# Patient Record
Sex: MALE | Race: WHITE | Employment: OTHER | URBAN - METROPOLITAN AREA
[De-identification: names, ages, dates, MRNs, and addresses within clinical notes are randomized per-mention and may not be internally consistent; named-entity substitution may affect disease eponyms.]

---

## 2019-03-22 ENCOUNTER — OFFICE VISIT (OUTPATIENT)
Dept: DERMATOLOGY | Facility: CLINIC | Age: 79
End: 2019-03-22
Payer: MEDICARE

## 2019-03-22 VITALS — HEART RATE: 70 BPM | DIASTOLIC BLOOD PRESSURE: 60 MMHG | OXYGEN SATURATION: 95 % | SYSTOLIC BLOOD PRESSURE: 98 MMHG

## 2019-03-22 DIAGNOSIS — D48.5 NEOPLASM OF UNCERTAIN BEHAVIOR OF SKIN: Primary | ICD-10-CM

## 2019-03-22 DIAGNOSIS — L82.0 INFLAMED SEBORRHEIC KERATOSIS: ICD-10-CM

## 2019-03-22 DIAGNOSIS — L82.1 SEBORRHEIC KERATOSIS: ICD-10-CM

## 2019-03-22 DIAGNOSIS — L57.0 ACTINIC KERATOSIS: ICD-10-CM

## 2019-03-22 DIAGNOSIS — L81.4 LENTIGO: ICD-10-CM

## 2019-03-22 PROCEDURE — 99203 OFFICE O/P NEW LOW 30 MIN: CPT | Mod: 25 | Performed by: PHYSICIAN ASSISTANT

## 2019-03-22 PROCEDURE — 88305 TISSUE EXAM BY PATHOLOGIST: CPT | Mod: TC | Performed by: PHYSICIAN ASSISTANT

## 2019-03-22 PROCEDURE — 17000 DESTRUCT PREMALG LESION: CPT | Mod: 59 | Performed by: PHYSICIAN ASSISTANT

## 2019-03-22 PROCEDURE — 17003 DESTRUCT PREMALG LES 2-14: CPT | Performed by: PHYSICIAN ASSISTANT

## 2019-03-22 PROCEDURE — 17110 DESTRUCTION B9 LES UP TO 14: CPT | Performed by: PHYSICIAN ASSISTANT

## 2019-03-22 PROCEDURE — 11102 TANGNTL BX SKIN SINGLE LES: CPT | Mod: 59 | Performed by: PHYSICIAN ASSISTANT

## 2019-03-22 RX ORDER — INSULIN DETEMIR 100 [IU]/ML
INJECTION, SOLUTION SUBCUTANEOUS
COMMUNITY
Start: 2019-03-06

## 2019-03-22 RX ORDER — WARFARIN SODIUM 7.5 MG/1
TABLET ORAL
COMMUNITY
Start: 2018-12-05

## 2019-03-22 RX ORDER — SACUBITRIL AND VALSARTAN 97; 103 MG/1; MG/1
TABLET, FILM COATED ORAL
COMMUNITY
Start: 2019-02-06

## 2019-03-22 RX ORDER — CARVEDILOL 12.5 MG/1
12.5 TABLET ORAL
COMMUNITY
Start: 2019-03-06

## 2019-03-22 RX ORDER — ASPIRIN 81 MG/1
81 TABLET, CHEWABLE ORAL
COMMUNITY

## 2019-03-22 RX ORDER — WARFARIN SODIUM 7.5 MG/1
TABLET ORAL
COMMUNITY
Start: 2019-02-19

## 2019-03-22 RX ORDER — NITROGLYCERIN 0.4 MG/1
0.4 TABLET SUBLINGUAL
COMMUNITY
Start: 2015-07-31

## 2019-03-22 RX ORDER — ATORVASTATIN CALCIUM 80 MG/1
TABLET, FILM COATED ORAL
COMMUNITY
Start: 2019-03-04

## 2019-03-22 RX ORDER — DIPHENOXYLATE HYDROCHLORIDE AND ATROPINE SULFATE 2.5; .025 MG/1; MG/1
1 TABLET ORAL
COMMUNITY
Start: 2018-03-07

## 2019-03-22 RX ORDER — AMOXICILLIN 500 MG/1
CAPSULE ORAL
COMMUNITY
Start: 2013-12-04

## 2019-03-22 RX ORDER — FUROSEMIDE 40 MG
40 TABLET ORAL
COMMUNITY
Start: 2019-03-06

## 2019-03-22 RX ORDER — INSULIN PUMP SYRINGE, 3 ML
EACH MISCELLANEOUS
COMMUNITY
Start: 2018-11-29

## 2019-03-22 RX ORDER — FUROSEMIDE 40 MG
TABLET ORAL
COMMUNITY
Start: 2019-01-16

## 2019-03-22 RX ORDER — INSULIN ASPART 100 [IU]/ML
INJECTION, SOLUTION INTRAVENOUS; SUBCUTANEOUS
COMMUNITY
Start: 2019-03-06

## 2019-03-22 RX ORDER — FERROUS GLUCONATE 324(38)MG
TABLET ORAL
COMMUNITY
Start: 2018-03-31

## 2019-03-22 RX ORDER — SPIRONOLACTONE 25 MG/1
25 TABLET ORAL
COMMUNITY
Start: 2018-12-11

## 2019-03-22 RX ORDER — ATORVASTATIN CALCIUM 80 MG/1
80 TABLET, FILM COATED ORAL
COMMUNITY
Start: 2018-11-29

## 2019-03-22 RX ORDER — INSULIN PUMP SYRINGE, 3 ML
EACH MISCELLANEOUS
COMMUNITY
Start: 2018-11-17

## 2019-03-22 RX ORDER — CARVEDILOL 12.5 MG/1
TABLET ORAL
COMMUNITY
Start: 2019-03-14

## 2019-03-22 RX ORDER — SACUBITRIL AND VALSARTAN 49; 51 MG/1; MG/1
TABLET, FILM COATED ORAL
COMMUNITY
Start: 2019-01-16

## 2019-03-22 NOTE — LETTER
3/22/2019         RE: Bertha High  [address] [facility] [address]        Dear Colleague,    Thank you for referring your patient, Bertha High, to the [facility]. Please see a copy of my visit note below.    Bertha High is a 78 year old year old male patient here today for spot on scalp.  Patient states this has been present for few months.  Patient reports the following symptoms:  Not healing.  Patient reports the following previous treatments none.  Patient has no other skin complaints today.  Remainder of the HPI, Meds, PMH, Allergies, FH, and SH was reviewed in chart.    Pertinent Hx:   No personal history of skin cancer.   History reviewed. No pertinent past medical history.    History reviewed. No pertinent surgical history.     Family History   Problem Relation Age of Onset     Melanoma No family hx of        Social History     Socioeconomic History     Marital status:      Spouse name: Not on file     Number of children: Not on file     Years of education: Not on file     Highest education level: Not on file   Occupational History     Not on file   Social Needs     Financial resource strain: Not on file     Food insecurity:     Worry: Not on file     Inability: Not on file     Transportation needs:     Medical: Not on file     Non-medical: Not on file   Tobacco Use     Smoking status: Former Smoker     Packs/day: 2.00     Years: 22.00     Pack years: 44.00     Types: Cigarettes     Last attempt to quit: 3/22/1968     Years since quittin.0     Smokeless tobacco: Never Used   Substance and Sexual Activity     Alcohol use: Not on file     Drug use: Not on file     Sexual activity: Not on file   Lifestyle     Physical activity:     Days per week: Not on file     Minutes per session: Not on file     Stress: Not on file   Relationships     Social connections:     Talks on phone: Not on file     Gets together: Not on file     Attends Shinto service: Not on file      Active member of club or organization: Not on file     Attends meetings of clubs or organizations: Not on file     Relationship status: Not on file     Intimate partner violence:     Fear of current or ex partner: Not on file     Emotionally abused: Not on file     Physically abused: Not on file     Forced sexual activity: Not on file   Other Topics Concern     Not on file   Social History Narrative     Not on file       Outpatient Encounter Medications as of 3/22/2019   Medication Sig Dispense Refill     amoxicillin (AMOXIL) 500 MG capsule Take FOUR tablets one hour before dental procedures.       aspirin (ASA) 81 MG chewable tablet Take 81 mg by mouth       atorvastatin (LIPITOR) 80 MG tablet Take 80 mg by mouth       atorvastatin (LIPITOR) 80 MG tablet        Blood Glucose Monitoring Suppl (FIFTY50 GLUCOSE METER 2.0) w/Device KIT Dispense meter, test strips, lancets covered by pt ins. E11.9 NIDDM type II - Test 4 times/day. Reason: New  meds       Blood Glucose Monitoring Suppl (FIFTY50 GLUCOSE METER 2.0) w/Device KIT Dispense meter, test strips, lancets covered by pt ins. E11.65 NIDDM type II, uncontrolled - Test 4 times/day. Reason: New medication       carvedilol (COREG) 12.5 MG tablet Take 12.5 mg by mouth       carvedilol (COREG) 12.5 MG tablet        cholecalciferol (D 5000) 5000 units CAPS Take one tablet every other day with iron.       DOCOSAHEXAENOIC ACID PO Take 1 tablet by mouth       ENTRESTO 49-51 MG per tablet        ENTRESTO  MG per tablet        ferrous gluconate (FERGON) 324 (38 Fe) MG tablet Take one tablet with food every other day, not daily. Take with orange juice or vitamin C to help iron absorption.       furosemide (LASIX) 40 MG tablet Take 40 mg by mouth       furosemide (LASIX) 40 MG tablet        insulin aspart (NOVOLOG FLEXPEN) 100 UNIT/ML pen Inject 8 units for breakfast and 15 units for lunch and dinner  - do not take if not eating.       insulin detemir (LEVEMIR FLEXTOUCH)  100 UNIT/ML pen Inject 15 Units Subcutaneous       insulin pen needle (BD SILKE U/F) 32G X 4 MM miscellaneous For administering insulin at home. 5 shots a day       insulin pen needle (BD SILKE U/F) 32G X 4 MM miscellaneous Use with Insulin       LEVEMIR FLEXTOUCH 100 UNIT/ML pen        metFORMIN (GLUCOPHAGE) 1000 MG tablet Take 1,000 mg by mouth       metFORMIN (GLUCOPHAGE) 1000 MG tablet        Misc Natural Products (GLUCOSAMINE CHONDROITIN ADV PO)        Multiple Vitamin (MULTI-VITAMINS) TABS Take 1 tablet by mouth       nitroGLYcerin (NITROSTAT) 0.4 MG sublingual tablet Place 0.4 mg under the tongue       NOVOLOG FLEXPEN 100 UNIT/ML soln        omeprazole (PRILOSEC) 20 MG DR capsule Take 20 mg by mouth       sacubitril-valsartan (ENTRESTO)  MG per tablet Take 1 tablet by mouth       spironolactone (ALDACTONE) 25 MG tablet Take 25 mg by mouth       warfarin (COUMADIN) 7.5 MG tablet Take 11.25 mg (7.5 mg x 1.5) on Thu; 7.5 mg (7.5 mg x 1) all other days or as directed.       warfarin (COUMADIN) 7.5 MG tablet        No facility-administered encounter medications on file as of 3/22/2019.              Review Of Systems  Skin: As above  Eyes: negative  Ears/Nose/Throat: negative  Respiratory: No shortness of breath, dyspnea on exertion, cough, or hemoptysis  Cardiovascular: negative  Gastrointestinal: negative  Genitourinary: negative  Musculoskeletal: negative  Neurologic: negative  Psychiatric: negative  Hematologic/Lymphatic/Immunologic: negative  Endocrine: negative      O:   NAD, WDWN, Alert & Oriented, Mood & Affect wnl, Vitals stable   Here today alone   BP 98/60   Pulse 70   SpO2 95%    General appearance normal   Vitals stable   Alert, oriented and in no acute distress     1.2 cm pink scaly plaque on frontal scalp  Pink gritty papules on scalp x 4  Brown stuck on papule on left side of nose     Eyes: Conjunctivae/lids:Normal     ENT: Lips: normal    MSK:Normal    Pulm: Breathing Normal    Neuro/Psych:  Orientation:Normal; Mood/Affect:Normal  A/P:  1. R/O SCC on frontal scalp  TANGENTIAL BIOPSY SENT OUT:  After consent, anesthesia with LEC and prep, tangential excision performed and specimen sent out for permanent section histology.  No complications and routine wound care. Patient told to call our office in 1-2 weeks for result.      2. Actinic keratosis x 4 on scalp   LN2:  Treated with LN2 for 5s for 1-2 cycles. Warned risks of blistering, pain, pigment change, scarring, and incomplete resolution.  Advised patient to return if lesions do not completely resolve.  Wound care sheet given.  3. Inflamed seborrheic keratosis on nose x 1  LN2:  Treated with LN2 for 5s for 1-2 cycles. Warned risks of blistering, pain, pigment change, scarring, and incomplete resolution.  Advised patient to return if lesions do not completely resolve.  Wound care sheet given.  3. Seborrheic keratosis, lentigo  BENIGN LESIONS DISCUSSED WITH PATIENT:  I discussed the specifics of tumor, prognosis, and genetics of benign lesions.  I explained that treatment of these lesions would be purely cosmetic and not medically neccessary.  I discussed with patient different removal options including excision, cautery and /or laser.      Nature and genetics of benign skin lesions dicussed with patient.  Signs and Symptoms of skin cancer discussed with patient.  ABCDEs of melanoma reviewed with patient.  Patient encouraged to perform monthly skin exams.  UV precautions reviewed with patient.  Risks of non-melanoma skin cancer discussed with patient   Return to clinic pending biopsy results.         Again, thank you for allowing me to participate in the care of your patient.        Sincerely,        Tiffanie Cherry PA-C

## 2019-03-22 NOTE — PATIENT INSTRUCTIONS
Wound Care Instructions     FOR SUPERFICIAL WOUNDS     [facility] [phone]    [facility] [phone]                       AFTER 24 HOURS YOU SHOULD REMOVE THE BANDAGE AND BEGIN DAILY DRESSING CHANGES AS FOLLOWS:     1) Remove Dressing.     2) Clean and dry the area with tap water using a Q-tip or sterile gauze pad.     3) Apply Vaseline, Aquaphor, Polysporin ointment or Bacitracin ointment over entire wound.  Do NOT use Neosporin ointment.     4) Cover the wound with a band-aid, or a sterile non-stick gauze pad and micropore paper tape      REPEAT THESE INSTRUCTIONS AT LEAST ONCE A DAY UNTIL THE WOUND HAS COMPLETELY HEALED.    It is an old wives tale that a wound heals better when it is exposed to air and allowed to dry out. The wound will heal faster with a better cosmetic result if it is kept moist with ointment and covered with a bandage.    **Do not let the wound dry out.**      Supplies Needed:      *Cotton tipped applicators (Q-tips)    *Polysporin Ointment or Bacitracin Ointment (NOT NEOSPORIN)    *Band-aids or non-stick gauze pads and micropore paper tape.      PATIENT INFORMATION:    During the healing process you will notice a number of changes. All wounds develop a small halo of redness surrounding the wound.  This means healing is occurring. Severe itching with extensive redness usually indicates sensitivity to the ointment or bandage tape used to dress the wound.  You should call our office if this develops.      Swelling  and/or discoloration around your surgical site is common, particularly when performed around the eye.    All wounds normally drain.  The larger the wound the more drainage there will be.  After 7-10 days, you will notice the wound beginning to shrink and new skin will begin to grow.  The wound is healed when you can see skin has formed over the entire area.  A healed wound has a healthy, shiny look to the surface and is red to dark pink in color  to normalize.  Wounds may take approximately 4-6 weeks to heal.  Larger wounds may take 6-8 weeks.  After the wound is healed you may discontinue dressing changes.    You may experience a sensation of tightness as your wound heals. This is normal and will gradually subside.    Your healed wound may be sensitive to temperature changes. This sensitivity improves with time, but if you re having a lot of discomfort, try to avoid temperature extremes.    Patients frequently experience itching after their wound appears to have healed because of the continue healing under the skin.  Plain Vaseline will help relieve the itching.        POSSIBLE COMPLICATIONS    BLEEDIN. Leave the bandage in place.  2. Use tightly rolled up gauze or a cloth to apply direct pressure over the bandage for 30  minutes.  3. Reapply pressure for an additional 30 minutes if necessary  4. Use additional gauze and tape to maintain pressure once the bleeding has stopped.    WOUND CARE INSTRUCTIONS   FOR CRYOSURGERY   This area treated with liquid nitrogen should form a blister (areas treated may or may not blister-skin may just turn dark and slough off). You do not need to bandage the area unless a blister forms and breaks (which may be a few days). When the blister breaks, begin daily dressing changes as follows:  1) Clean and dry the area with tap water using clean Q-tip or sterile gauze pad.   2) Apply Polysporin ointment or Bacitracin ointment over entire wound. Do NOT use Neosporin ointment.   3) Cover the wound with a band-aid or sterile non-stick gauze pad and micropore paper tape.   REPEAT THESE INSTRUCTIONS AT LEAST ONCE A DAY UNTIL THE WOUND HAS COMPLETELY HEALED.   It is an old wives tale that a wound heals better when it is exposed to air and allowed to dry out. The wound will heal faster with a better cosmetic result if it is kept moist with ointment and covered with a bandage.   Do not let the wound dry out.   IMPORTANT  INFORMATION ON REVERSE SIDE   Supplies Needed:   *Cotton tipped applicators (Q-tips)   *Polysporin ointment or Bacitracin ointment (NOT NEOSPORIN)   *Band-aids, or non stick gauze pads and micropore paper tape   PATIENT INFORMATION   During the healing process you will notice a number of changes. All wounds develop a small halo of redness surrounding the wound. This means healing is occurring. Severe itching with extensive redness usually indicates sensitivity to the ointment or bandage tape used to dress the wound. You should call our office if this develops.   Swelling and/or discoloration around your surgical site is common, particularly when performed around the eye.   All wounds normally drain. The larger the wound the more drainage there will be. After 7-10 days, you will notice the wound beginning to shrink and new skin will begin to grow. The wound is healed when you can see skin has formed over the entire area. A healed wound has a healthy, shiny look to the surface and is red to dark pink in color to normalize. Wounds may take approximately 4-6 weeks to heal. Larger wounds may take 6-8 weeks. After the wound is healed you may discontinue dressing changes.   You may experience a sensation of tightness as your wound heals. This is normal and will gradually subside.   Your healed wound may be sensitive to temperature changes. This sensitivity improves with time, but if you re having a lot of discomfort, try to avoid temperature extremes.   Patients frequently experience itching after their wound appears to have healed because of the continue healing under the skin. Plain Vaseline will help relieve the itching.

## 2019-03-22 NOTE — PROGRESS NOTES
Bertha High is a 78 year old year old male patient here today for spot on scalp.  Patient states this has been present for few months.  Patient reports the following symptoms:  Not healing.  Patient reports the following previous treatments none.  Patient has no other skin complaints today.  Remainder of the HPI, Meds, PMH, Allergies, FH, and SH was reviewed in chart.    Pertinent Hx:   No personal history of skin cancer.   History reviewed. No pertinent past medical history.    History reviewed. No pertinent surgical history.     Family History   Problem Relation Age of Onset     Melanoma No family hx of        Social History     Socioeconomic History     Marital status:      Spouse name: Not on file     Number of children: Not on file     Years of education: Not on file     Highest education level: Not on file   Occupational History     Not on file   Social Needs     Financial resource strain: Not on file     Food insecurity:     Worry: Not on file     Inability: Not on file     Transportation needs:     Medical: Not on file     Non-medical: Not on file   Tobacco Use     Smoking status: Former Smoker     Packs/day: 2.00     Years: 22.00     Pack years: 44.00     Types: Cigarettes     Last attempt to quit: 3/22/1968     Years since quittin.0     Smokeless tobacco: Never Used   Substance and Sexual Activity     Alcohol use: Not on file     Drug use: Not on file     Sexual activity: Not on file   Lifestyle     Physical activity:     Days per week: Not on file     Minutes per session: Not on file     Stress: Not on file   Relationships     Social connections:     Talks on phone: Not on file     Gets together: Not on file     Attends Hindu service: Not on file     Active member of club or organization: Not on file     Attends meetings of clubs or organizations: Not on file     Relationship status: Not on file     Intimate partner violence:     Fear of current or ex partner: Not on file      Emotionally abused: Not on file     Physically abused: Not on file     Forced sexual activity: Not on file   Other Topics Concern     Not on file   Social History Narrative     Not on file       Outpatient Encounter Medications as of 3/22/2019   Medication Sig Dispense Refill     amoxicillin (AMOXIL) 500 MG capsule Take FOUR tablets one hour before dental procedures.       aspirin (ASA) 81 MG chewable tablet Take 81 mg by mouth       atorvastatin (LIPITOR) 80 MG tablet Take 80 mg by mouth       atorvastatin (LIPITOR) 80 MG tablet        Blood Glucose Monitoring Suppl (FIFTY50 GLUCOSE METER 2.0) w/Device KIT Dispense meter, test strips, lancets covered by pt ins. E11.9 NIDDM type II - Test 4 times/day. Reason: New  meds       Blood Glucose Monitoring Suppl (FIFTY50 GLUCOSE METER 2.0) w/Device KIT Dispense meter, test strips, lancets covered by pt ins. E11.65 NIDDM type II, uncontrolled - Test 4 times/day. Reason: New medication       carvedilol (COREG) 12.5 MG tablet Take 12.5 mg by mouth       carvedilol (COREG) 12.5 MG tablet        cholecalciferol (D 5000) 5000 units CAPS Take one tablet every other day with iron.       DOCOSAHEXAENOIC ACID PO Take 1 tablet by mouth       ENTRESTO 49-51 MG per tablet        ENTRESTO  MG per tablet        ferrous gluconate (FERGON) 324 (38 Fe) MG tablet Take one tablet with food every other day, not daily. Take with orange juice or vitamin C to help iron absorption.       furosemide (LASIX) 40 MG tablet Take 40 mg by mouth       furosemide (LASIX) 40 MG tablet        insulin aspart (NOVOLOG FLEXPEN) 100 UNIT/ML pen Inject 8 units for breakfast and 15 units for lunch and dinner  - do not take if not eating.       insulin detemir (LEVEMIR FLEXTOUCH) 100 UNIT/ML pen Inject 15 Units Subcutaneous       insulin pen needle (BD SILKE U/F) 32G X 4 MM miscellaneous For administering insulin at home. 5 shots a day       insulin pen needle (BD SILKE U/F) 32G X 4 MM miscellaneous Use with  Insulin       LEVEMIR FLEXTOUCH 100 UNIT/ML pen        metFORMIN (GLUCOPHAGE) 1000 MG tablet Take 1,000 mg by mouth       metFORMIN (GLUCOPHAGE) 1000 MG tablet        Misc Natural Products (GLUCOSAMINE CHONDROITIN ADV PO)        Multiple Vitamin (MULTI-VITAMINS) TABS Take 1 tablet by mouth       nitroGLYcerin (NITROSTAT) 0.4 MG sublingual tablet Place 0.4 mg under the tongue       NOVOLOG FLEXPEN 100 UNIT/ML soln        omeprazole (PRILOSEC) 20 MG DR capsule Take 20 mg by mouth       sacubitril-valsartan (ENTRESTO)  MG per tablet Take 1 tablet by mouth       spironolactone (ALDACTONE) 25 MG tablet Take 25 mg by mouth       warfarin (COUMADIN) 7.5 MG tablet Take 11.25 mg (7.5 mg x 1.5) on Thu; 7.5 mg (7.5 mg x 1) all other days or as directed.       warfarin (COUMADIN) 7.5 MG tablet        No facility-administered encounter medications on file as of 3/22/2019.              Review Of Systems  Skin: As above  Eyes: negative  Ears/Nose/Throat: negative  Respiratory: No shortness of breath, dyspnea on exertion, cough, or hemoptysis  Cardiovascular: negative  Gastrointestinal: negative  Genitourinary: negative  Musculoskeletal: negative  Neurologic: negative  Psychiatric: negative  Hematologic/Lymphatic/Immunologic: negative  Endocrine: negative      O:   NAD, WDWN, Alert & Oriented, Mood & Affect wnl, Vitals stable   Here today alone   BP 98/60   Pulse 70   SpO2 95%    General appearance normal   Vitals stable   Alert, oriented and in no acute distress     1.2 cm pink scaly plaque on frontal scalp  Pink gritty papules on scalp x 4  Brown stuck on papule on left side of nose     Eyes: Conjunctivae/lids:Normal     ENT: Lips: normal    MSK:Normal    Pulm: Breathing Normal    Neuro/Psych: Orientation:Normal; Mood/Affect:Normal  A/P:  1. R/O SCC on frontal scalp  TANGENTIAL BIOPSY SENT OUT:  After consent, anesthesia with LEC and prep, tangential excision performed and specimen sent out for permanent section  histology.  No complications and routine wound care. Patient told to call our office in 1-2 weeks for result.      2. Actinic keratosis x 4 on scalp   LN2:  Treated with LN2 for 5s for 1-2 cycles. Warned risks of blistering, pain, pigment change, scarring, and incomplete resolution.  Advised patient to return if lesions do not completely resolve.  Wound care sheet given.  3. Inflamed seborrheic keratosis on nose x 1  LN2:  Treated with LN2 for 5s for 1-2 cycles. Warned risks of blistering, pain, pigment change, scarring, and incomplete resolution.  Advised patient to return if lesions do not completely resolve.  Wound care sheet given.  3. Seborrheic keratosis, lentigo  BENIGN LESIONS DISCUSSED WITH PATIENT:  I discussed the specifics of tumor, prognosis, and genetics of benign lesions.  I explained that treatment of these lesions would be purely cosmetic and not medically neccessary.  I discussed with patient different removal options including excision, cautery and /or laser.      Nature and genetics of benign skin lesions dicussed with patient.  Signs and Symptoms of skin cancer discussed with patient.  ABCDEs of melanoma reviewed with patient.  Patient encouraged to perform monthly skin exams.  UV precautions reviewed with patient.  Risks of non-melanoma skin cancer discussed with patient   Return to clinic pending biopsy results.

## 2019-03-22 NOTE — NURSING NOTE
Initial BP 98/60   Pulse 70   SpO2 95%  There is no height or weight on file to calculate BMI. .    Gricel Felix LPN

## 2019-03-26 LAB — COPATH REPORT: NORMAL

## 2019-04-08 NOTE — PROGRESS NOTES
Surgical Office Location :   [facility]  [address] [place] [address]

## 2019-04-09 ENCOUNTER — OFFICE VISIT (OUTPATIENT)
Dept: DERMATOLOGY | Facility: CLINIC | Age: 79
End: 2019-04-09
Payer: MEDICARE

## 2019-04-09 VITALS — SYSTOLIC BLOOD PRESSURE: 110 MMHG | DIASTOLIC BLOOD PRESSURE: 65 MMHG | OXYGEN SATURATION: 97 % | HEART RATE: 74 BPM

## 2019-04-09 DIAGNOSIS — C44.42 SQUAMOUS CELL CARCINOMA OF SCALP: Primary | ICD-10-CM

## 2019-04-09 PROCEDURE — 17311 MOHS 1 STAGE H/N/HF/G: CPT | Performed by: DERMATOLOGY

## 2019-04-09 NOTE — LETTER
2019         RE: Bertha High  [address] [facility] [address]        Dear Colleague,    Thank you for referring your patient, Bertha High, to the [facility]. Please see a copy of my visit note below.    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   .  Patient states this has been present for a while.  Patient reports the following symptoms:  Not healing.  Patient reports the following previous treatments cryo .  Patient reports the following modifying factors none.  Associated symptoms: none.  Patient has no other skin complaints today.  Remainder of the HPI, Meds, PMH, Allergies, FH, and SH was reviewed in chart.      Past Medical History:   Diagnosis Date     Squamous cell carcinoma        History reviewed. No pertinent surgical history.     Family History   Problem Relation Age of Onset     Melanoma No family hx of        Social History     Socioeconomic History     Marital status:      Spouse name: Not on file     Number of children: Not on file     Years of education: Not on file     Highest education level: Not on file   Occupational History     Not on file   Social Needs     Financial resource strain: Not on file     Food insecurity:     Worry: Not on file     Inability: Not on file     Transportation needs:     Medical: Not on file     Non-medical: Not on file   Tobacco Use     Smoking status: Former Smoker     Packs/day: 2.00     Years: 22.00     Pack years: 44.00     Types: Cigarettes     Last attempt to quit: 3/22/1968     Years since quittin.0     Smokeless tobacco: Never Used   Substance and Sexual Activity     Alcohol use: Not on file     Drug use: Not on file     Sexual activity: Not on file   Lifestyle     Physical activity:     Days per week: Not on file     Minutes per  session: Not on file     Stress: Not on file   Relationships     Social connections:     Talks on phone: Not on file     Gets together: Not on file     Attends [employer] service: Not on file     Active member of club or organization: Not on file     Attends meetings of clubs or organizations: Not on file     Relationship status: Not on file     Intimate partner violence:     Fear of current or ex partner: Not on file     Emotionally abused: Not on file     Physically abused: Not on file     Forced sexual activity: Not on file   Other Topics Concern     Not on file   Social History Narrative     Not on file       Outpatient Encounter Medications as of 4/9/2019   Medication Sig Dispense Refill     amoxicillin (AMOXIL) 500 MG capsule Take FOUR tablets one hour before dental procedures.       aspirin (ASA) 81 MG chewable tablet Take 81 mg by mouth       atorvastatin (LIPITOR) 80 MG tablet Take 80 mg by mouth       atorvastatin (LIPITOR) 80 MG tablet        Blood Glucose Monitoring Suppl (FIFTY50 GLUCOSE METER 2.0) w/Device KIT Dispense meter, test strips, lancets covered by pt ins. E11.9 NIDDM type II - Test 4 times/day. Reason: New  meds       Blood Glucose Monitoring Suppl (FIFTY50 GLUCOSE METER 2.0) w/Device KIT Dispense meter, test strips, lancets covered by pt ins. E11.65 NIDDM type II, uncontrolled - Test 4 times/day. Reason: New medication       carvedilol (COREG) 12.5 MG tablet Take 12.5 mg by mouth       carvedilol (COREG) 12.5 MG tablet        cholecalciferol (D 5000) 5000 units CAPS Take one tablet every other day with iron.       DOCOSAHEXAENOIC ACID PO Take 1 tablet by mouth       ENTRESTO 49-51 MG per tablet        ENTRESTO  MG per tablet        ferrous gluconate (FERGON) 324 (38 Fe) MG tablet Take one tablet with food every other day, not daily. Take with orange juice or vitamin C to help iron absorption.       furosemide (LASIX) 40 MG tablet Take 40 mg by mouth       furosemide (LASIX) 40 MG tablet         insulin aspart (NOVOLOG FLEXPEN) 100 UNIT/ML pen Inject 8 units for breakfast and 15 units for lunch and dinner  - do not take if not eating.       insulin detemir (LEVEMIR FLEXTOUCH) 100 UNIT/ML pen Inject 15 Units Subcutaneous       insulin pen needle (BD SILKE U/F) 32G X 4 MM miscellaneous For administering insulin at home. 5 shots a day       insulin pen needle (BD SILKE U/F) 32G X 4 MM miscellaneous Use with Insulin       LEVEMIR FLEXTOUCH 100 UNIT/ML pen        metFORMIN (GLUCOPHAGE) 1000 MG tablet Take 1,000 mg by mouth       metFORMIN (GLUCOPHAGE) 1000 MG tablet        Misc Natural Products (GLUCOSAMINE CHONDROITIN ADV PO)        Multiple Vitamin (MULTI-VITAMINS) TABS Take 1 tablet by mouth       nitroGLYcerin (NITROSTAT) 0.4 MG sublingual tablet Place 0.4 mg under the tongue       NOVOLOG FLEXPEN 100 UNIT/ML soln        omeprazole (PRILOSEC) 20 MG DR capsule Take 20 mg by mouth       sacubitril-valsartan (ENTRESTO)  MG per tablet Take 1 tablet by mouth       spironolactone (ALDACTONE) 25 MG tablet Take 25 mg by mouth       warfarin (COUMADIN) 7.5 MG tablet Take 11.25 mg (7.5 mg x 1.5) on Thu; 7.5 mg (7.5 mg x 1) all other days or as directed.       warfarin (COUMADIN) 7.5 MG tablet        No facility-administered encounter medications on file as of 4/9/2019.              Review Of Systems  Skin: As above  Eyes: negative  Ears/Nose/Throat: negative  Respiratory: No shortness of breath, dyspnea on exertion, cough, or hemoptysis  Cardiovascular: negative  Gastrointestinal: negative  Genitourinary: negative  Musculoskeletal: negative  Neurologic: negative  Psychiatric: negative  Hematologic/Lymphatic/Immunologic: negative  Endocrine: negative      O:   NAD, WDWN, Alert & Oriented, Mood & Affect wnl, Vitals stable   Here today alone   /65   Pulse 74   SpO2 97%    General appearance normal   Vitals stable   Alert, oriented and in no acute distress      Following lymph nodes palpated: Occipital,  Cervical, Supraclavicular no lad   Mid frontal scalp 1.2cm red plaque      Eyes: Conjunctivae/lids:Normal     ENT: Lips, buccal mucosa, tongue: normal    MSK:Normal    Cardiovascular: peripheral edema none    Pulm: Breathing Normal    Lymph Nodes: No Head and Neck Lymphadenopathy     Neuro/Psych: Orientation:Normal; Mood/Affect:Normal      A/P:  1. Scalp squamous cell carcinoma   MOHS:   Location    After PGACAC discussed with patient, decision for Mohs surgery was made. Indication for Mohs was Location. Patient confirmed the site with Dr. Hodges.  After anesthesia with LEC, the tumor was excised using standard Mohs technique in 1 stages(s).  CLEAR MARGINS OBTAINED and Final defect size was 1.5 x 1.6 cm.       REPAIR SECOND INTENT: We discussed the options for wound management in full with the patient including risks/benefits/possible outcomes. Decision made to allow the wound to heal by second intention. EBL minimal; complications none; wound care routine.  The patient was discharged in good condition and will return in one month or prn for wound evaluation.  BENIGN LESIONS DISCUSSED WITH PATIENT:  I discussed the specifics of tumor, prognosis, and genetics of benign lesions.  I explained that treatment of these lesions would be purely cosmetic and not medically neccessary.  I discussed with patient different removal options including excision, cautery and /or laser.      Nature and genetics of benign skin lesions dicussed with patient.  Signs and Symptoms of skin cancer discussed with patient.  Patient encouraged to perform monthly skin exams.  UV precautions reviewed with patient.  Patient to follow up with Primary Care provider regarding elevated blood pressure.  Skin care regimen reviewed with patient: Eliminate harsh soaps, i.e. Dial, zest, irsih spring; Mild soaps such as Cetaphil or Dove sensitive skin, avoid hot or cold showers, aggressive use of emollients including vanicream, cetaphil or cerave discussed  with patient.    Risks of non-melanoma skin cancer discussed with patient   Return to clinic 6 months  Rynard to follow up with Primary Care provider regarding elevated blood pressure.      Again, thank you for allowing me to participate in the care of your patient.        Sincerely,        Robbin Hodges MD

## 2019-04-09 NOTE — NURSING NOTE
Chief Complaint   Patient presents with     Derm Problem     mohs       Vitals:    04/09/19 0735   BP: 110/65   Pulse: 74   SpO2: 97%     Wt Readings from Last 1 Encounters:   No data found for Wt       Flores Drake LPN.................4/9/2019

## 2019-04-09 NOTE — PROGRESS NOTES
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   .  Patient states this has been present for a while.  Patient reports the following symptoms:  Not healing.  Patient reports the following previous treatments cryo .  Patient reports the following modifying factors none.  Associated symptoms: none.  Patient has no other skin complaints today.  Remainder of the HPI, Meds, PMH, Allergies, FH, and SH was reviewed in chart.      Past Medical History:   Diagnosis Date     Squamous cell carcinoma        History reviewed. No pertinent surgical history.     Family History   Problem Relation Age of Onset     Melanoma No family hx of        Social History     Socioeconomic History     Marital status:      Spouse name: Not on file     Number of children: Not on file     Years of education: Not on file     Highest education level: Not on file   Occupational History     Not on file   Social Needs     Financial resource strain: Not on file     Food insecurity:     Worry: Not on file     Inability: Not on file     Transportation needs:     Medical: Not on file     Non-medical: Not on file   Tobacco Use     Smoking status: Former Smoker     Packs/day: 2.00     Years: 22.00     Pack years: 44.00     Types: Cigarettes     Last attempt to quit: 3/22/1968     Years since quittin.0     Smokeless tobacco: Never Used   Substance and Sexual Activity     Alcohol use: Not on file     Drug use: Not on file     Sexual activity: Not on file   Lifestyle     Physical activity:     Days per week: Not on file     Minutes per session: Not on file     Stress: Not on file   Relationships     Social connections:     Talks on phone: Not on file     Gets together: Not on file     Attends L[employer] day service: Not on file     Active member of club or organization: Not on file     Attends meetings of clubs or organizations: Not on file     Relationship status: Not on  file     Intimate partner violence:     Fear of current or ex partner: Not on file     Emotionally abused: Not on file     Physically abused: Not on file     Forced sexual activity: Not on file   Other Topics Concern     Not on file   Social History Narrative     Not on file       Outpatient Encounter Medications as of 4/9/2019   Medication Sig Dispense Refill     amoxicillin (AMOXIL) 500 MG capsule Take FOUR tablets one hour before dental procedures.       aspirin (ASA) 81 MG chewable tablet Take 81 mg by mouth       atorvastatin (LIPITOR) 80 MG tablet Take 80 mg by mouth       atorvastatin (LIPITOR) 80 MG tablet        Blood Glucose Monitoring Suppl (FIFTY50 GLUCOSE METER 2.0) w/Device KIT Dispense meter, test strips, lancets covered by pt ins. E11.9 NIDDM type II - Test 4 times/day. Reason: New  meds       Blood Glucose Monitoring Suppl (FIFTY50 GLUCOSE METER 2.0) w/Device KIT Dispense meter, test strips, lancets covered by pt ins. E11.65 NIDDM type II, uncontrolled - Test 4 times/day. Reason: New medication       carvedilol (COREG) 12.5 MG tablet Take 12.5 mg by mouth       carvedilol (COREG) 12.5 MG tablet        cholecalciferol (D 5000) 5000 units CAPS Take one tablet every other day with iron.       DOCOSAHEXAENOIC ACID PO Take 1 tablet by mouth       ENTRESTO 49-51 MG per tablet        ENTRESTO  MG per tablet        ferrous gluconate (FERGON) 324 (38 Fe) MG tablet Take one tablet with food every other day, not daily. Take with orange juice or vitamin C to help iron absorption.       furosemide (LASIX) 40 MG tablet Take 40 mg by mouth       furosemide (LASIX) 40 MG tablet        insulin aspart (NOVOLOG FLEXPEN) 100 UNIT/ML pen Inject 8 units for breakfast and 15 units for lunch and dinner  - do not take if not eating.       insulin detemir (LEVEMIR FLEXTOUCH) 100 UNIT/ML pen Inject 15 Units Subcutaneous       insulin pen needle (BD SILKE U/F) 32G X 4 MM miscellaneous For administering insulin at home. 5  shots a day       insulin pen needle (BD SILKE U/F) 32G X 4 MM miscellaneous Use with Insulin       LEVEMIR FLEXTOUCH 100 UNIT/ML pen        metFORMIN (GLUCOPHAGE) 1000 MG tablet Take 1,000 mg by mouth       metFORMIN (GLUCOPHAGE) 1000 MG tablet        Misc Natural Products (GLUCOSAMINE CHONDROITIN ADV PO)        Multiple Vitamin (MULTI-VITAMINS) TABS Take 1 tablet by mouth       nitroGLYcerin (NITROSTAT) 0.4 MG sublingual tablet Place 0.4 mg under the tongue       NOVOLOG FLEXPEN 100 UNIT/ML soln        omeprazole (PRILOSEC) 20 MG DR capsule Take 20 mg by mouth       sacubitril-valsartan (ENTRESTO)  MG per tablet Take 1 tablet by mouth       spironolactone (ALDACTONE) 25 MG tablet Take 25 mg by mouth       warfarin (COUMADIN) 7.5 MG tablet Take 11.25 mg (7.5 mg x 1.5) on Thu; 7.5 mg (7.5 mg x 1) all other days or as directed.       warfarin (COUMADIN) 7.5 MG tablet        No facility-administered encounter medications on file as of 4/9/2019.              Review Of Systems  Skin: As above  Eyes: negative  Ears/Nose/Throat: negative  Respiratory: No shortness of breath, dyspnea on exertion, cough, or hemoptysis  Cardiovascular: negative  Gastrointestinal: negative  Genitourinary: negative  Musculoskeletal: negative  Neurologic: negative  Psychiatric: negative  Hematologic/Lymphatic/Immunologic: negative  Endocrine: negative      O:   NAD, WDWN, Alert & Oriented, Mood & Affect wnl, Vitals stable   Here today alone   /65   Pulse 74   SpO2 97%    General appearance normal   Vitals stable   Alert, oriented and in no acute distress      Following lymph nodes palpated: Occipital, Cervical, Supraclavicular no lad   Mid frontal scalp 1.2cm red plaque      Eyes: Conjunctivae/lids:Normal     ENT: Lips, buccal mucosa, tongue: normal    MSK:Normal    Cardiovascular: peripheral edema none    Pulm: Breathing Normal    Lymph Nodes: No Head and Neck Lymphadenopathy     Neuro/Psych: Orientation:Normal;  Mood/Affect:Normal      A/P:  1. Scalp squamous cell carcinoma   MOHS:   Location    After PGACAC discussed with patient, decision for Mohs surgery was made. Indication for Mohs was Location. Patient confirmed the site with Dr. Hodges.  After anesthesia with LEC, the tumor was excised using standard Mohs technique in 1 stages(s).  CLEAR MARGINS OBTAINED and Final defect size was 1.5 x 1.6 cm.       REPAIR SECOND INTENT: We discussed the options for wound management in full with the patient including risks/benefits/possible outcomes. Decision made to allow the wound to heal by second intention. EBL minimal; complications none; wound care routine.  The patient was discharged in good condition and will return in one month or prn for wound evaluation.  BENIGN LESIONS DISCUSSED WITH PATIENT:  I discussed the specifics of tumor, prognosis, and genetics of benign lesions.  I explained that treatment of these lesions would be purely cosmetic and not medically neccessary.  I discussed with patient different removal options including excision, cautery and /or laser.      Nature and genetics of benign skin lesions dicussed with patient.  Signs and Symptoms of skin cancer discussed with patient.  Patient encouraged to perform monthly skin exams.  UV precautions reviewed with patient.  Patient to follow up with Primary Care provider regarding elevated blood pressure.  Skin care regimen reviewed with patient: Eliminate harsh soaps, i.e. Dial, zest, irsih spring; Mild soaps such as Cetaphil or Dove sensitive skin, avoid hot or cold showers, aggressive use of emollients including vanicream, cetaphil or cerave discussed with patient.    Risks of non-melanoma skin cancer discussed with patient   Return to clinic 6 months  Rynard to follow up with Primary Care provider regarding elevated blood pressure.

## 2019-04-09 NOTE — PATIENT INSTRUCTIONS
Open Wound Care     for Mid Frontal Scalp        ? No strenuous activity for 48 hours    ? Take Tylenol as needed for discomfort.                                                .         ? Do not drink alcoholic beverages for 48 hours.    ? Keep the pressure bandage in place for 24 hours. If the bandage becomes blood tinged or loose, reinforce it with gauze and tape.        (Refer to the reverse side of this page for management of bleeding).    ? Remove bandage in 24 hours and begin wound care as follows:     1. Clean area with tap water using a Q tip or gauze pad, (shower / bathe normally)  2. Dry wound with Q tip or gauze pad  3. Apply Aquaphor, Vaseline, Polysporin or Bacitracin Ointment with a Q tip    Do NOT use Neosporin Ointment *  4. Cover the wound with a band-aid or nonstick gauze pad and paper tape.  5. Repeat wound care once a day until wound is completely healed.    It is an old wives tale that a wound heals better when it is exposed to air and allowed to dry out. The wound will heal faster with a better cosmetic result if it is kept moist with ointment and covered with a bandage.  Do not let the wound dry out.      Supplies Needed:                Qtips or gauze pads                Polysporin or Bacitracin Ointment                Bandaids or nonstick gauze pads and paper tape    Wound care kits and brown paper tape are available for purchase at   the pharmacy.    BLEEDIN. Use tightly rolled up gauze or cloth to apply direct pressure over the bandage for 20   minutes.  2. Reapply pressure for an additional 20 minutes if necessary  3. Call the office or go to the nearest emergency room if pressure fails to stop the bleeding.  4. Use additional gauze and tape to maintain pressure once the bleeding has stopped.  5. Begin wound care 24 hours after surgery as directed.                  WOUND HEALING    1. One week after surgery a pink / red halo will form around the outside of the wound.   This is new  skin.  2. The center of the wound will appear yellowish white and produce some drainage.  3. The pink halo will slowly migrate in toward the center of the wound until the wound is covered with new shiny pink skin.  4. There will be no more drainage when the wound is completely healed.  5. It will take six months to one year for the redness to fade.  6. The scar may be itchy, tight and sensitive to extreme temperatures for a year after the surgery.  7. Massaging the area several times a day for several minutes after the wound is completely healed will help the scar soften and normalize faster. Begin massage only after healing is complete.      In case of emergency call: Dr Hodges: [phone]     [facility]: [phone]    [facility]: [phone]

## 2019-10-01 ENCOUNTER — OFFICE VISIT (OUTPATIENT)
Dept: DERMATOLOGY | Facility: CLINIC | Age: 79
End: 2019-10-01
Payer: MEDICARE

## 2019-10-01 VITALS — DIASTOLIC BLOOD PRESSURE: 69 MMHG | OXYGEN SATURATION: 96 % | HEART RATE: 74 BPM | SYSTOLIC BLOOD PRESSURE: 120 MMHG

## 2019-10-01 DIAGNOSIS — L81.4 LENTIGO: ICD-10-CM

## 2019-10-01 DIAGNOSIS — L82.0 INFLAMED SEBORRHEIC KERATOSIS: ICD-10-CM

## 2019-10-01 DIAGNOSIS — L57.0 AK (ACTINIC KERATOSIS): ICD-10-CM

## 2019-10-01 DIAGNOSIS — D18.01 ANGIOMA OF SKIN: ICD-10-CM

## 2019-10-01 DIAGNOSIS — D23.9 DERMAL NEVUS: ICD-10-CM

## 2019-10-01 DIAGNOSIS — Z85.828 HISTORY OF SKIN CANCER: Primary | ICD-10-CM

## 2019-10-01 DIAGNOSIS — L82.1 SEBORRHEIC KERATOSIS: ICD-10-CM

## 2019-10-01 PROCEDURE — 17110 DESTRUCTION B9 LES UP TO 14: CPT | Performed by: DERMATOLOGY

## 2019-10-01 PROCEDURE — 17000 DESTRUCT PREMALG LESION: CPT | Mod: 59 | Performed by: DERMATOLOGY

## 2019-10-01 PROCEDURE — 17003 DESTRUCT PREMALG LES 2-14: CPT | Mod: 59 | Performed by: DERMATOLOGY

## 2019-10-01 PROCEDURE — 99213 OFFICE O/P EST LOW 20 MIN: CPT | Mod: 25 | Performed by: DERMATOLOGY

## 2019-10-01 NOTE — LETTER
10/1/2019         RE: Bertha High  [address] [facility] [id number]        Dear Colleague,    Thank you for referring your patient, Bertha High, to the [facility]. Please see a copy of my visit note below.    Bertha High is a 78 year old year old male patient here today for spot on scalp and nose.   .  Patient states this has been present for a while.  Patient reports the following symptoms:  scalecryo on nose a couple times.  .  Patient reports the following previous treatments cryo .  Patient reports the following modifying factors none.  Associated symptoms: none.  Patient has no other skin complaints today.  Remainder of the HPI, Meds, PMH, Allergies, FH, and SH was reviewed in chart.      Past Medical History:   Diagnosis Date     Squamous cell carcinoma        History reviewed. No pertinent surgical history.     Family History   Problem Relation Age of Onset     Melanoma No family hx of        Social History     Socioeconomic History     Marital status:      Spouse name: Not on file     Number of children: Not on file     Years of education: Not on file     Highest education level: Not on file   Occupational History     Not on file   Social Needs     Financial resource strain: Not on file     Food insecurity:     Worry: Not on file     Inability: Not on file     Transportation needs:     Medical: Not on file     Non-medical: Not on file   Tobacco Use     Smoking status: Former Smoker     Packs/day: 2.00     Years: 22.00     Pack years: 44.00     Types: Cigarettes     Last attempt to quit: 3/22/1968     Years since quittin.5     Smokeless tobacco: Never Used   Substance and Sexual Activity     Alcohol use: Not on file     Drug use: Not on file     Sexual activity: Not on file   Lifestyle     Physical activity:     Days per week: Not on file     Minutes per session: Not on file     Stress: Not on file   Relationships     Social connections:     Talks on phone: Not on  file     Gets together: Not on file     Attends Anabaptism service: Not on file     Active member of club or organization: Not on file     Attends meetings of clubs or organizations: Not on file     Relationship status: Not on file     Intimate partner violence:     Fear of current or ex partner: Not on file     Emotionally abused: Not on file     Physically abused: Not on file     Forced sexual activity: Not on file   Other Topics Concern     Not on file   Social History Narrative     Not on file       Outpatient Encounter Medications as of 10/1/2019   Medication Sig Dispense Refill     amoxicillin (AMOXIL) 500 MG capsule Take FOUR tablets one hour before dental procedures.       aspirin (ASA) 81 MG chewable tablet Take 81 mg by mouth       atorvastatin (LIPITOR) 80 MG tablet Take 80 mg by mouth       atorvastatin (LIPITOR) 80 MG tablet        Blood Glucose Monitoring Suppl (FIFTY50 GLUCOSE METER 2.0) w/Device KIT Dispense meter, test strips, lancets covered by pt ins. E11.9 NIDDM type II - Test 4 times/day. Reason: New  meds       Blood Glucose Monitoring Suppl (FIFTY50 GLUCOSE METER 2.0) w/Device KIT Dispense meter, test strips, lancets covered by pt ins. E11.65 NIDDM type II, uncontrolled - Test 4 times/day. Reason: New medication       carvedilol (COREG) 12.5 MG tablet Take 12.5 mg by mouth       carvedilol (COREG) 12.5 MG tablet        cholecalciferol (D 5000) 5000 units CAPS Take one tablet every other day with iron.       DOCOSAHEXAENOIC ACID PO Take 1 tablet by mouth       ENTRESTO 49-51 MG per tablet        ENTRESTO  MG per tablet        ferrous gluconate (FERGON) 324 (38 Fe) MG tablet Take one tablet with food every other day, not daily. Take with orange juice or vitamin C to help iron absorption.       furosemide (LASIX) 40 MG tablet Take 40 mg by mouth       furosemide (LASIX) 40 MG tablet        insulin aspart (NOVOLOG FLEXPEN) 100 UNIT/ML pen Inject 8 units for breakfast and 15 units for lunch  and dinner  - do not take if not eating.       insulin detemir (LEVEMIR FLEXTOUCH) 100 UNIT/ML pen Inject 15 Units Subcutaneous       insulin pen needle (BD SILKE U/F) 32G X 4 MM miscellaneous For administering insulin at home. 5 shots a day       insulin pen needle (BD SILKE U/F) 32G X 4 MM miscellaneous Use with Insulin       LEVEMIR FLEXTOUCH 100 UNIT/ML pen        metFORMIN (GLUCOPHAGE) 1000 MG tablet Take 1,000 mg by mouth       metFORMIN (GLUCOPHAGE) 1000 MG tablet        Misc Natural Products (GLUCOSAMINE CHONDROITIN ADV PO)        Multiple Vitamin (MULTI-VITAMINS) TABS Take 1 tablet by mouth       nitroGLYcerin (NITROSTAT) 0.4 MG sublingual tablet Place 0.4 mg under the tongue       NOVOLOG FLEXPEN 100 UNIT/ML soln        omeprazole (PRILOSEC) 20 MG DR capsule Take 20 mg by mouth       sacubitril-valsartan (ENTRESTO)  MG per tablet Take 1 tablet by mouth       spironolactone (ALDACTONE) 25 MG tablet Take 25 mg by mouth       warfarin (COUMADIN) 7.5 MG tablet Take 11.25 mg (7.5 mg x 1.5) on Thu; 7.5 mg (7.5 mg x 1) all other days or as directed.       warfarin (COUMADIN) 7.5 MG tablet        No facility-administered encounter medications on file as of 10/1/2019.              Review Of Systems  Skin: As above  Eyes: negative  Ears/Nose/Throat: negative  Respiratory: No shortness of breath, dyspnea on exertion, cough, or hemoptysis  Cardiovascular: negative  Gastrointestinal: negative  Genitourinary: negative  Musculoskeletal: negative  Neurologic: negative  Psychiatric: negative  Hematologic/Lymphatic/Immunologic: negative  Endocrine: negative      O:   NAD, WDWN, Alert & Oriented, Mood & Affect wnl, Vitals stable   Here today alone   /69   Pulse 74   SpO2 96%    General appearance normal   Vitals stable   Alert, oriented and in no acute distress      Following lymph nodes palpated: Occipital, Cervical, Supraclavicular no lad   Nasal tip gritty papules   Scalp inflamed seborrheic keratosis  x3       Stuck on papules and brown macules on trunk and ext   Red papules on trunk  Flesh colored papules on trunk     The remainder of the full exam was unremarkable; the following areas were examined:  conjunctiva/lids, oral mucosa, neck, peripheral vascular system, abdomen, lymph nodes, digits/nails, eccrine and apocrine glands, scalp/hair, face, neck, chest, abdomen, buttocks, back, RUE, LUE, RLE, LLE       Eyes: Conjunctivae/lids:Normal     ENT: Lips, buccal mucosa, tongue: normal    MSK:Normal    Cardiovascular: peripheral edema none    Pulm: Breathing Normal    Lymph Nodes: No Head and Neck Lymphadenopathy     Neuro/Psych: Orientation:Normal; Mood/Affect:Normal      A/P:  1. Seborrheic keratosis, lentigo, angioma, dermal nevus, hx of non-melanoma skin cancer   2. Actinic keratosis x3  LN2:  Treated with LN2 for 5s for 1-2 cycles. Warned risks of blistering, pain, pigment change, scarring, and incomplete resolution.  Advised patient to return if lesions do not completely resolve.  Wound care sheet given.  Nose  3. Inflamed seborrheic keratosis x3  LN2:  Treated with LN2 for 5s for 1-2 cycles. Warned risks of blistering, pain, pigment change, scarring, and incomplete resolution.  Advised patient to return if lesions do not completely resolve.  Wound care sheet given.    BENIGN LESIONS DISCUSSED WITH PATIENT:  I discussed the specifics of tumor, prognosis, and genetics of benign lesions.  I explained that treatment of these lesions would be purely cosmetic and not medically neccessary.  I discussed with patient different removal options including excision, cautery and /or laser.      Nature and genetics of benign skin lesions dicussed with patient.  Signs and Symptoms of skin cancer discussed with patient.  Patient encouraged to perform monthly skin exams.  UV precautions reviewed with patient.  Skin care regimen reviewed with patient: Eliminate harsh soaps, i.e. Dial, zest, irsih spring; Mild soaps such as  Cetaphil or Dove sensitive skin, avoid hot or cold showers, aggressive use of emollients including vanicream, cetaphil or cerave discussed with patient.    Risks of non-melanoma skin cancer discussed with patient   Return to clinic 3 months      Again, thank you for allowing me to participate in the care of your patient.        Sincerely,        Robbin Hodges MD

## 2019-10-01 NOTE — PROGRESS NOTES
Bertha High is a 78 year old year old male patient here today for spot on scalp and nose.   .  Patient states this has been present for a while.  Patient reports the following symptoms:  scalecryo on nose a couple times.  .  Patient reports the following previous treatments cryo .  Patient reports the following modifying factors none.  Associated symptoms: none.  Patient has no other skin complaints today.  Remainder of the HPI, Meds, PMH, Allergies, FH, and SH was reviewed in chart.      Past Medical History:   Diagnosis Date     Squamous cell carcinoma        History reviewed. No pertinent surgical history.     Family History   Problem Relation Age of Onset     Melanoma No family hx of        Social History     Socioeconomic History     Marital status:      Spouse name: Not on file     Number of children: Not on file     Years of education: Not on file     Highest education level: Not on file   Occupational History     Not on file   Social Needs     Financial resource strain: Not on file     Food insecurity:     Worry: Not on file     Inability: Not on file     Transportation needs:     Medical: Not on file     Non-medical: Not on file   Tobacco Use     Smoking status: Former Smoker     Packs/day: 2.00     Years: 22.00     Pack years: 44.00     Types: Cigarettes     Last attempt to quit: 3/22/1968     Years since quittin.5     Smokeless tobacco: Never Used   Substance and Sexual Activity     Alcohol use: Not on file     Drug use: Not on file     Sexual activity: Not on file   Lifestyle     Physical activity:     Days per week: Not on file     Minutes per session: Not on file     Stress: Not on file   Relationships     Social connections:     Talks on phone: Not on file     Gets together: Not on file     Attends Confucianist service: Not on file     Active member of club or organization: Not on file     Attends meetings of clubs or organizations: Not on file     Relationship status: Not on file      Intimate partner violence:     Fear of current or ex partner: Not on file     Emotionally abused: Not on file     Physically abused: Not on file     Forced sexual activity: Not on file   Other Topics Concern     Not on file   Social History Narrative     Not on file       Outpatient Encounter Medications as of 10/1/2019   Medication Sig Dispense Refill     amoxicillin (AMOXIL) 500 MG capsule Take FOUR tablets one hour before dental procedures.       aspirin (ASA) 81 MG chewable tablet Take 81 mg by mouth       atorvastatin (LIPITOR) 80 MG tablet Take 80 mg by mouth       atorvastatin (LIPITOR) 80 MG tablet        Blood Glucose Monitoring Suppl (FIFTY50 GLUCOSE METER 2.0) w/Device KIT Dispense meter, test strips, lancets covered by pt ins. E11.9 NIDDM type II - Test 4 times/day. Reason: New  meds       Blood Glucose Monitoring Suppl (FIFTY50 GLUCOSE METER 2.0) w/Device KIT Dispense meter, test strips, lancets covered by pt ins. E11.65 NIDDM type II, uncontrolled - Test 4 times/day. Reason: New medication       carvedilol (COREG) 12.5 MG tablet Take 12.5 mg by mouth       carvedilol (COREG) 12.5 MG tablet        cholecalciferol (D 5000) 5000 units CAPS Take one tablet every other day with iron.       DOCOSAHEXAENOIC ACID PO Take 1 tablet by mouth       ENTRESTO 49-51 MG per tablet        ENTRESTO  MG per tablet        ferrous gluconate (FERGON) 324 (38 Fe) MG tablet Take one tablet with food every other day, not daily. Take with orange juice or vitamin C to help iron absorption.       furosemide (LASIX) 40 MG tablet Take 40 mg by mouth       furosemide (LASIX) 40 MG tablet        insulin aspart (NOVOLOG FLEXPEN) 100 UNIT/ML pen Inject 8 units for breakfast and 15 units for lunch and dinner  - do not take if not eating.       insulin detemir (LEVEMIR FLEXTOUCH) 100 UNIT/ML pen Inject 15 Units Subcutaneous       insulin pen needle (BD SILKE U/F) 32G X 4 MM miscellaneous For administering insulin at home. 5 shots a  day       insulin pen needle (BD SILEK U/F) 32G X 4 MM miscellaneous Use with Insulin       LEVEMIR FLEXTOUCH 100 UNIT/ML pen        metFORMIN (GLUCOPHAGE) 1000 MG tablet Take 1,000 mg by mouth       metFORMIN (GLUCOPHAGE) 1000 MG tablet        Misc Natural Products (GLUCOSAMINE CHONDROITIN ADV PO)        Multiple Vitamin (MULTI-VITAMINS) TABS Take 1 tablet by mouth       nitroGLYcerin (NITROSTAT) 0.4 MG sublingual tablet Place 0.4 mg under the tongue       NOVOLOG FLEXPEN 100 UNIT/ML soln        omeprazole (PRILOSEC) 20 MG DR capsule Take 20 mg by mouth       sacubitril-valsartan (ENTRESTO)  MG per tablet Take 1 tablet by mouth       spironolactone (ALDACTONE) 25 MG tablet Take 25 mg by mouth       warfarin (COUMADIN) 7.5 MG tablet Take 11.25 mg (7.5 mg x 1.5) on Thu; 7.5 mg (7.5 mg x 1) all other days or as directed.       warfarin (COUMADIN) 7.5 MG tablet        No facility-administered encounter medications on file as of 10/1/2019.              Review Of Systems  Skin: As above  Eyes: negative  Ears/Nose/Throat: negative  Respiratory: No shortness of breath, dyspnea on exertion, cough, or hemoptysis  Cardiovascular: negative  Gastrointestinal: negative  Genitourinary: negative  Musculoskeletal: negative  Neurologic: negative  Psychiatric: negative  Hematologic/Lymphatic/Immunologic: negative  Endocrine: negative      O:   NAD, WDWN, Alert & Oriented, Mood & Affect wnl, Vitals stable   Here today alone   /69   Pulse 74   SpO2 96%    General appearance normal   Vitals stable   Alert, oriented and in no acute distress      Following lymph nodes palpated: Occipital, Cervical, Supraclavicular no lad   Nasal tip gritty papules   Scalp inflamed seborrheic keratosis x3       Stuck on papules and brown macules on trunk and ext   Red papules on trunk  Flesh colored papules on trunk     The remainder of the full exam was unremarkable; the following areas were examined:  conjunctiva/lids, oral mucosa, neck,  peripheral vascular system, abdomen, lymph nodes, digits/nails, eccrine and apocrine glands, scalp/hair, face, neck, chest, abdomen, buttocks, back, RUE, LUE, RLE, LLE       Eyes: Conjunctivae/lids:Normal     ENT: Lips, buccal mucosa, tongue: normal    MSK:Normal    Cardiovascular: peripheral edema none    Pulm: Breathing Normal    Lymph Nodes: No Head and Neck Lymphadenopathy     Neuro/Psych: Orientation:Normal; Mood/Affect:Normal      A/P:  1. Seborrheic keratosis, lentigo, angioma, dermal nevus, hx of non-melanoma skin cancer   2. Actinic keratosis x3  LN2:  Treated with LN2 for 5s for 1-2 cycles. Warned risks of blistering, pain, pigment change, scarring, and incomplete resolution.  Advised patient to return if lesions do not completely resolve.  Wound care sheet given.  Nose  3. Inflamed seborrheic keratosis x3  LN2:  Treated with LN2 for 5s for 1-2 cycles. Warned risks of blistering, pain, pigment change, scarring, and incomplete resolution.  Advised patient to return if lesions do not completely resolve.  Wound care sheet given.    BENIGN LESIONS DISCUSSED WITH PATIENT:  I discussed the specifics of tumor, prognosis, and genetics of benign lesions.  I explained that treatment of these lesions would be purely cosmetic and not medically neccessary.  I discussed with patient different removal options including excision, cautery and /or laser.      Nature and genetics of benign skin lesions dicussed with patient.  Signs and Symptoms of skin cancer discussed with patient.  Patient encouraged to perform monthly skin exams.  UV precautions reviewed with patient.  Skin care regimen reviewed with patient: Eliminate harsh soaps, i.e. Dial, zest, irsih spring; Mild soaps such as Cetaphil or Dove sensitive skin, avoid hot or cold showers, aggressive use of emollients including vanicream, cetaphil or cerave discussed with patient.    Risks of non-melanoma skin cancer discussed with patient   Return to clinic 3 months